# Patient Record
Sex: FEMALE | Race: WHITE | NOT HISPANIC OR LATINO | Employment: PART TIME | URBAN - NONMETROPOLITAN AREA
[De-identification: names, ages, dates, MRNs, and addresses within clinical notes are randomized per-mention and may not be internally consistent; named-entity substitution may affect disease eponyms.]

---

## 2019-08-01 ENCOUNTER — OFFICE VISIT (OUTPATIENT)
Dept: FAMILY MEDICINE CLINIC | Facility: CLINIC | Age: 24
End: 2019-08-01

## 2019-08-01 ENCOUNTER — LAB (OUTPATIENT)
Dept: LAB | Facility: OTHER | Age: 24
End: 2019-08-01

## 2019-08-01 VITALS
HEART RATE: 71 BPM | HEIGHT: 63 IN | DIASTOLIC BLOOD PRESSURE: 72 MMHG | BODY MASS INDEX: 25.69 KG/M2 | WEIGHT: 145 LBS | TEMPERATURE: 97.8 F | SYSTOLIC BLOOD PRESSURE: 110 MMHG

## 2019-08-01 DIAGNOSIS — F41.9 ANXIETY: Primary | ICD-10-CM

## 2019-08-01 DIAGNOSIS — F41.9 ANXIETY: ICD-10-CM

## 2019-08-01 LAB
ALBUMIN SERPL-MCNC: 5 G/DL (ref 3.5–5)
ALBUMIN/GLOB SERPL: 1.4 G/DL (ref 1.1–1.8)
ALP SERPL-CCNC: 56 U/L (ref 38–126)
ALT SERPL W P-5'-P-CCNC: 12 U/L
ANION GAP SERPL CALCULATED.3IONS-SCNC: 13 MMOL/L (ref 5–15)
AST SERPL-CCNC: 18 U/L (ref 14–36)
BASOPHILS # BLD AUTO: 0.08 10*3/MM3 (ref 0–0.2)
BASOPHILS NFR BLD AUTO: 0.9 % (ref 0–1.5)
BILIRUB SERPL-MCNC: 0.4 MG/DL (ref 0.2–1.3)
BUN BLD-MCNC: 9 MG/DL (ref 7–23)
BUN/CREAT SERPL: 11.8 (ref 7–25)
CALCIUM SPEC-SCNC: 10.1 MG/DL (ref 8.4–10.2)
CHLORIDE SERPL-SCNC: 104 MMOL/L (ref 101–112)
CO2 SERPL-SCNC: 25 MMOL/L (ref 22–30)
CREAT BLD-MCNC: 0.76 MG/DL (ref 0.52–1.04)
DEPRECATED RDW RBC AUTO: 39.7 FL (ref 37–54)
EOSINOPHIL # BLD AUTO: 0.19 10*3/MM3 (ref 0–0.4)
EOSINOPHIL NFR BLD AUTO: 2.1 % (ref 0.3–6.2)
ERYTHROCYTE [DISTWIDTH] IN BLOOD BY AUTOMATED COUNT: 12.9 % (ref 12.3–15.4)
GFR SERPL CREATININE-BSD FRML MDRD: 93 ML/MIN/1.73 (ref 71–165)
GLOBULIN UR ELPH-MCNC: 3.7 GM/DL (ref 2.3–3.5)
GLUCOSE BLD-MCNC: 86 MG/DL (ref 70–99)
HCT VFR BLD AUTO: 43.3 % (ref 34–46.6)
HGB BLD-MCNC: 14.8 G/DL (ref 12–15.9)
LYMPHOCYTES # BLD AUTO: 2.73 10*3/MM3 (ref 0.7–3.1)
LYMPHOCYTES NFR BLD AUTO: 29.6 % (ref 19.6–45.3)
MCH RBC QN AUTO: 29.2 PG (ref 26.6–33)
MCHC RBC AUTO-ENTMCNC: 34.2 G/DL (ref 31.5–35.7)
MCV RBC AUTO: 85.6 FL (ref 79–97)
MONOCYTES # BLD AUTO: 0.69 10*3/MM3 (ref 0.1–0.9)
MONOCYTES NFR BLD AUTO: 7.5 % (ref 5–12)
NEUTROPHILS # BLD AUTO: 5.52 10*3/MM3 (ref 1.7–7)
NEUTROPHILS NFR BLD AUTO: 59.9 % (ref 42.7–76)
PLATELET # BLD AUTO: 330 10*3/MM3 (ref 140–450)
PMV BLD AUTO: 11.1 FL (ref 6–12)
POTASSIUM BLD-SCNC: 3.9 MMOL/L (ref 3.4–5)
PROT SERPL-MCNC: 8.7 G/DL (ref 6.3–8.6)
RBC # BLD AUTO: 5.06 10*6/MM3 (ref 3.77–5.28)
SODIUM BLD-SCNC: 142 MMOL/L (ref 137–145)
WBC NRBC COR # BLD: 9.21 10*3/MM3 (ref 3.4–10.8)

## 2019-08-01 PROCEDURE — 80053 COMPREHEN METABOLIC PANEL: CPT | Performed by: NURSE PRACTITIONER

## 2019-08-01 PROCEDURE — 84443 ASSAY THYROID STIM HORMONE: CPT | Performed by: NURSE PRACTITIONER

## 2019-08-01 PROCEDURE — 99202 OFFICE O/P NEW SF 15 MIN: CPT | Performed by: NURSE PRACTITIONER

## 2019-08-01 PROCEDURE — 36415 COLL VENOUS BLD VENIPUNCTURE: CPT | Performed by: NURSE PRACTITIONER

## 2019-08-01 PROCEDURE — 85025 COMPLETE CBC W/AUTO DIFF WBC: CPT | Performed by: NURSE PRACTITIONER

## 2019-08-01 RX ORDER — BUSPIRONE HYDROCHLORIDE 5 MG/1
5 TABLET ORAL 2 TIMES DAILY
Qty: 60 TABLET | Refills: 0 | Status: SHIPPED | OUTPATIENT
Start: 2019-08-01 | End: 2019-09-05 | Stop reason: SINTOL

## 2019-08-01 RX ORDER — ESTAZOLAM 2 MG/1
1 TABLET ORAL DAILY
Refills: 11 | COMMUNITY
Start: 2019-07-14

## 2019-08-01 NOTE — PROGRESS NOTES
Subjective   Fatuma Espinal is a 23 y.o. female. Patient here today with complaints of Anxiety  pt here today with mother complaining of anxiety x months, thinks may have had during high school as well, denies thoughts of suicide / homicide. Has not been on medicine for this in the past. Does not have difficulty falling asleep, occasionally has trouble staying asleep. Has had panic attacks, rubbing hands bilat, feeling shaky and crying at times.     Vitals:    08/01/19 1340   BP: 110/72   Pulse: 71   Temp: 97.8 °F (36.6 °C)     History reviewed. No pertinent past medical history.  Anxiety   Presents for initial visit. Onset was at an unknown time. The problem has been waxing and waning. Symptoms include irritability, nervous/anxious behavior, panic and restlessness. Patient reports no shortness of breath or suicidal ideas. Symptoms occur most days. The severity of symptoms is interfering with daily activities and causing significant distress. The symptoms are aggravated by work stress. The quality of sleep is fair.     Her past medical history is significant for anxiety/panic attacks. Past treatments include nothing.        The following portions of the patient's history were reviewed and updated as appropriate: allergies, current medications, past family history, past medical history, past social history, past surgical history and problem list.    Review of Systems   Constitutional: Positive for irritability.   HENT: Negative.    Eyes: Negative.    Respiratory: Negative.  Negative for shortness of breath.    Cardiovascular: Negative.    Gastrointestinal: Negative.    Endocrine: Negative.    Genitourinary: Negative.    Musculoskeletal: Negative.    Skin: Negative.    Allergic/Immunologic: Negative.    Neurological: Negative.    Hematological: Negative.    Psychiatric/Behavioral: Negative for suicidal ideas. The patient is nervous/anxious.        Objective   Physical Exam   Constitutional: She is oriented to  person, place, and time. She appears well-developed and well-nourished. No distress.   HENT:   Head: Normocephalic and atraumatic.   Cardiovascular: Normal rate, regular rhythm and normal heart sounds. Exam reveals no gallop and no friction rub.   No murmur heard.  Pulmonary/Chest: Effort normal and breath sounds normal. No stridor. No respiratory distress. She has no wheezes. She has no rales.   Neurological: She is alert and oriented to person, place, and time.   Skin: Skin is warm and dry. No rash noted. She is not diaphoretic. No erythema. No pallor.   Psychiatric: Her speech is normal. Judgment and thought content normal. Her mood appears anxious. She is slowed. She is not actively hallucinating. Cognition and memory are normal.   Appears well kempt, discusses her problems fairly openly, makes eye contact, denies thoughts of suicide/homicide, not tearful with conversation.  She is attentive.   Nursing note and vitals reviewed.      Assessment/Plan   Fatuma was seen today for anxiety.    Diagnoses and all orders for this visit:    Anxiety  -     CBC & Differential; Future  -     Comprehensive metabolic panel; Future  -     TSH; Future    Other orders  -     busPIRone (BUSPAR) 5 MG tablet; Take 1 tablet by mouth 2 (Two) Times a Day.    after much discussion, pt started on buspar bid as above, suggested she take bid scheduled but can take prn anxiety  Follow up in 1 month for recheck, sooner if nec  Numbers to counseling given, pt to call on her own to make appointment  She is also given name of psych, dr kapley, in [place] if needed  They are aware and are in agreement to this plan  All questions and concerns are addressed with understanding noted.

## 2019-08-02 LAB — TSH SERPL DL<=0.05 MIU/L-ACNC: 2.02 MIU/ML (ref 0.27–4.2)

## 2019-09-05 ENCOUNTER — OFFICE VISIT (OUTPATIENT)
Dept: FAMILY MEDICINE CLINIC | Facility: CLINIC | Age: 24
End: 2019-09-05

## 2019-09-05 VITALS
BODY MASS INDEX: 25.34 KG/M2 | HEART RATE: 70 BPM | DIASTOLIC BLOOD PRESSURE: 62 MMHG | TEMPERATURE: 98.3 F | SYSTOLIC BLOOD PRESSURE: 120 MMHG | HEIGHT: 63 IN | WEIGHT: 143 LBS

## 2019-09-05 DIAGNOSIS — F41.9 ANXIETY: Primary | Chronic | ICD-10-CM

## 2019-09-05 PROCEDURE — 99213 OFFICE O/P EST LOW 20 MIN: CPT | Performed by: NURSE PRACTITIONER

## 2019-09-05 RX ORDER — HYDROXYZINE HYDROCHLORIDE 25 MG/1
25 TABLET, FILM COATED ORAL 3 TIMES DAILY PRN
Qty: 90 TABLET | Refills: 1 | Status: SHIPPED | OUTPATIENT
Start: 2019-09-05 | End: 2019-10-03 | Stop reason: SDUPTHER

## 2019-09-07 NOTE — PROGRESS NOTES
"Subjective   Fatuma Espinal is a 23 y.o. female. Patient here today with complaints of Follow-up and Anxiety  Patient here today with mom for recheck of anxiety, at last visit she was started on BuSpar and states that she has taken this sporadically since last visit here, it did cause vivid dreams, bad dreams and only helped \"a little \".  She states that she has not seen counselors yet, states that she is going to try to make changes at work so that she is not working as long and not working double shifts which she thinks will help her anxiety and stress as well.  Denies depressive symptoms, mother indicates that she would like patient to start on a daily medication such as an antidepressant however patient declines this stating she would rather take a medicine as needed before taking an antidepressant daily.  She denies thoughts of suicide or homicide.    Vitals:    09/05/19 1301   BP: 120/62   Pulse: 70   Temp: 98.3 °F (36.8 °C)     History reviewed. No pertinent past medical history.  Anxiety   Presents for follow-up visit. Symptoms include excessive worry, insomnia, irritability, nervous/anxious behavior and restlessness. Patient reports no depressed mood, shortness of breath or suicidal ideas. Symptoms occur most days. The severity of symptoms is causing significant distress. The quality of sleep is poor.     Compliance with medications is 51-75%.        The following portions of the patient's history were reviewed and updated as appropriate: allergies, current medications, past family history, past medical history, past social history, past surgical history and problem list.    Review of Systems   Constitutional: Positive for irritability.   HENT: Negative.    Eyes: Negative.    Respiratory: Negative.  Negative for shortness of breath.    Cardiovascular: Negative.    Gastrointestinal: Negative.    Endocrine: Negative.    Genitourinary: Negative.    Musculoskeletal: Negative.    Skin: Negative.  "   Allergic/Immunologic: Negative.    Neurological: Negative.    Hematological: Negative.    Psychiatric/Behavioral: Positive for sleep disturbance. Negative for self-injury and suicidal ideas. The patient is nervous/anxious and has insomnia.        Objective   Physical Exam   Constitutional: She is oriented to person, place, and time. She appears well-developed and well-nourished. No distress.   HENT:   Head: Normocephalic and atraumatic.   Cardiovascular: Normal rate.   Pulmonary/Chest: Effort normal.   Neurological: She is alert and oriented to person, place, and time.   Skin: Skin is warm and dry. She is not diaphoretic.   Psychiatric: Her speech is normal and behavior is normal. Judgment and thought content normal. Her mood appears anxious. Her affect is not angry, not blunt, not labile and not inappropriate. She is not actively hallucinating. Cognition and memory are normal. She does not exhibit a depressed mood.   Patient making eye contact, not tearful with conversation, appears well kempt, denies thoughts of suicide or homicide, does appear anxious that examination, talkative but speech is not necessarily rapid or pressured. She is attentive.   Nursing note reviewed.      Assessment/Plan   Fatuma was seen today for follow-up and anxiety.    Diagnoses and all orders for this visit:    Anxiety    Other orders  -     hydrOXYzine (ATARAX) 25 MG tablet; Take 1 tablet by mouth 3 (Three) Times a Day As Needed for Anxiety.    I will change from BuSpar to hydroxyzine as above 3 times daily as needed anxiety but she is informed that this may cause sedation so she needs to not drive or work on medication initially until she knows how its going to affect her.  She will follow-up again in 1 month for recheck or sooner as needed.  Again encouraged counseling, if symptoms persist or worsen at next visit consider antidepressants daily.  Mom and patient are aware and seem to be in agreement to this plan.  Numbers to  counselors and psych, Dr. Kapley, given to her and she will call on her own for appointment.  All questions and concerns are addressed with understanding noted.

## 2019-10-03 ENCOUNTER — OFFICE VISIT (OUTPATIENT)
Dept: FAMILY MEDICINE CLINIC | Facility: CLINIC | Age: 24
End: 2019-10-03

## 2019-10-03 VITALS
DIASTOLIC BLOOD PRESSURE: 57 MMHG | SYSTOLIC BLOOD PRESSURE: 110 MMHG | TEMPERATURE: 97.4 F | HEART RATE: 74 BPM | HEIGHT: 63 IN | WEIGHT: 150.4 LBS | BODY MASS INDEX: 26.65 KG/M2

## 2019-10-03 DIAGNOSIS — F41.9 ANXIETY: Primary | Chronic | ICD-10-CM

## 2019-10-03 PROCEDURE — 99213 OFFICE O/P EST LOW 20 MIN: CPT | Performed by: NURSE PRACTITIONER

## 2019-10-03 RX ORDER — HYDROXYZINE HYDROCHLORIDE 25 MG/1
25 TABLET, FILM COATED ORAL 3 TIMES DAILY PRN
Qty: 90 TABLET | Refills: 5 | Status: SHIPPED | OUTPATIENT
Start: 2019-10-03

## 2019-10-03 NOTE — PROGRESS NOTES
"Subjective   Fatuma Espinal is a 23 y.o. female. Patient here today with complaints of Follow-up and Anxiety  pt here today for recheck of anxiety, reports is doing well, sleeping well, taking atarax 25 mg tid and terrance well, states does not have side effect of sedation and anxiety has improved significantly. States \"I am feeling more relaxed\". Does not see counselor at present but states does not feel need to see since has improved so much. No longer having \"weird dreams\" since changed medicine also.     Vitals:    10/03/19 0948   BP: 110/57   Pulse: 74   Temp: 97.4 °F (36.3 °C)     History reviewed. No pertinent past medical history.  Anxiety   Presents for follow-up visit. Patient reports no depressed mood, dizziness, excessive worry, nervous/anxious behavior or shortness of breath. Symptoms occur occasionally. The severity of symptoms is mild. The quality of sleep is good. Nighttime awakenings: none.     Compliance with medications is %.        The following portions of the patient's history were reviewed and updated as appropriate: allergies, current medications, past family history, past medical history, past social history, past surgical history and problem list.    Review of Systems   Constitutional: Negative.    HENT: Negative.    Eyes: Negative.    Respiratory: Negative.  Negative for shortness of breath.    Cardiovascular: Negative.    Gastrointestinal: Negative.    Endocrine: Negative.    Genitourinary: Negative.    Musculoskeletal: Negative.    Skin: Negative.    Allergic/Immunologic: Negative.    Neurological: Negative.  Negative for dizziness.   Hematological: Negative.    Psychiatric/Behavioral: Negative.  The patient is not nervous/anxious.        Objective   Physical Exam   Constitutional: She is oriented to person, place, and time. She appears well-developed and well-nourished. No distress.   HENT:   Head: Normocephalic and atraumatic.   Cardiovascular: Normal rate, regular rhythm and normal " heart sounds. Exam reveals no gallop and no friction rub.   No murmur heard.  Pulmonary/Chest: Effort normal and breath sounds normal. No stridor. No respiratory distress. She has no wheezes. She has no rales.   Neurological: She is alert and oriented to person, place, and time.   Skin: Skin is warm and dry. No rash noted. She is not diaphoretic. No erythema. No pallor.   Psychiatric: She has a normal mood and affect. Her speech is normal and behavior is normal. Judgment and thought content normal. She is not actively hallucinating.   Pt appears well kempt, discusses her problems openly, makes eye contact, does not have flat affect, not tearful with conversation, smiling during exam. No complaints of suicide/homicide She is attentive.   Nursing note and vitals reviewed.      Assessment/Plan   Fatuma was seen today for follow-up and anxiety.    Diagnoses and all orders for this visit:    Anxiety    Other orders  -     hydrOXYzine (ATARAX) 25 MG tablet; Take 1 tablet by mouth 3 (Three) Times a Day As Needed for Anxiety.    will continue on atarax tid prn anxiety, follow up in 6 months or sooner if nec. She is given refills on atarax as above, she is aware and is in agreement to this plan. All questions and concerns are addressed with understanding noted.

## 2019-12-12 ENCOUNTER — OFFICE VISIT (OUTPATIENT)
Dept: FAMILY MEDICINE CLINIC | Facility: CLINIC | Age: 24
End: 2019-12-12

## 2019-12-12 VITALS
SYSTOLIC BLOOD PRESSURE: 118 MMHG | BODY MASS INDEX: 27.93 KG/M2 | DIASTOLIC BLOOD PRESSURE: 68 MMHG | HEIGHT: 63 IN | WEIGHT: 157.6 LBS | TEMPERATURE: 97.3 F | HEART RATE: 63 BPM

## 2019-12-12 DIAGNOSIS — L98.9 SKIN LESION: Primary | ICD-10-CM

## 2019-12-12 PROCEDURE — 99212 OFFICE O/P EST SF 10 MIN: CPT | Performed by: NURSE PRACTITIONER
